# Patient Record
Sex: FEMALE | URBAN - METROPOLITAN AREA
[De-identification: names, ages, dates, MRNs, and addresses within clinical notes are randomized per-mention and may not be internally consistent; named-entity substitution may affect disease eponyms.]

---
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
## 2024-05-22 NOTE — PROCEDURE: MEDICATION COUNSELING
Addended by: KODY SOLIZ on: 5/22/2024 11:02 AM     Modules accepted: Orders     Propranolol Pregnancy And Lactation Text: This medication is Pregnancy Category C and it isn't known if it is safe during pregnancy. It is excreted in breast milk.

## 2024-11-21 NOTE — PROCEDURE: MEDICATION COUNSELING
Addended by: JULIUS RIVERA on: 11/21/2024 02:58 PM     Modules accepted: Orders     Opioid Pregnancy And Lactation Text: These medications can lead to premature delivery and should be avoided during pregnancy. These medications are also present in breast milk in small amounts.

## 2025-07-28 NOTE — PROCEDURE: MEDICATION COUNSELING
Patient here today for nurse blood pressure check.    BP Readings from Last 1 Encounters:   07/28/25 1030 (!) 146/74   07/28/25 1020 (!) 150/78   07/28/25 1019 (!) 151/77     Pulse Readings from Last 1 Encounters:   07/28/25 91       Last Clinician Visit for this condition: 7/14/25  Per that visit, plan of care: none  Next office visit is scheduled for: 7/15/2026    Current BP Medications are:   amLODIPine (NORVASC) 10 MG tablet 90 tablet 3 7/14/2025 --    Sig - Route: Take 1 tablet by mouth daily. - Oral        Please review and advise if there are any changes to current plan of care.  Next Nurse BP visit scheduled: No    Home cuff verified   Patient home readings below:     Date             Time            BP                Pulse  7/15/25        8:08 AM       157/87                   97                      9:41 PM       [id number]                    99  7/16/25        7:000 AM     137/69                   88                      8:31 PM       157/92                   90  7/17/25        8:46 AM       150/70                   87                      7:42 PM       148/105       96  7/18/25        8:05 AM       146/71                   86                      5:59 PM       135/95                   96  7/19/25        10:21 AM     144/90                   95                      9:45 PM       144/131       97  7/20/25        10:45 AM     141/98                   102                      10:42 Pm     141/78                   97  7/21/25        1:55 PM       157/87                   86                      9:00 PM       158/79                   101  7/22/25        9:54 AM       144/78                   101                      11:57 PM     132/121       95  7/23/25        10:15 AM     144/74                   97  7/24/25        12:50 PM     149/71                   96                      10:18 PM     160/78                   93  7/25/25        10:00 AM     148/74                   85                      9:57 PM       147/67                    102  7/26/25        9:40 AM       144/62                   93  7/27/25        10:08 AM     145/67                   97                      10:32 AM     145/69                   74  7/28/25        8:10 AM       161/86                   102   Acitretin Pregnancy And Lactation Text: This medication is Pregnancy Category X and should not be given to women who are pregnant or may become pregnant in the future. This medication is excreted in breast milk.